# Patient Record
Sex: FEMALE | Race: WHITE | NOT HISPANIC OR LATINO | Employment: FULL TIME | URBAN - METROPOLITAN AREA
[De-identification: names, ages, dates, MRNs, and addresses within clinical notes are randomized per-mention and may not be internally consistent; named-entity substitution may affect disease eponyms.]

---

## 2019-06-06 ENCOUNTER — OFFICE VISIT (OUTPATIENT)
Dept: URGENT CARE | Facility: CLINIC | Age: 54
End: 2019-06-06
Payer: OTHER MISCELLANEOUS

## 2019-06-06 VITALS
OXYGEN SATURATION: 100 % | HEART RATE: 57 BPM | BODY MASS INDEX: 24.63 KG/M2 | HEIGHT: 63 IN | SYSTOLIC BLOOD PRESSURE: 105 MMHG | DIASTOLIC BLOOD PRESSURE: 65 MMHG | RESPIRATION RATE: 16 BRPM | WEIGHT: 139 LBS | TEMPERATURE: 98 F

## 2019-06-06 DIAGNOSIS — S00.03XA CONTUSION OF SCALP, INITIAL ENCOUNTER: ICD-10-CM
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
## 2019-06-06 PROCEDURE — 99203 PR OFFICE/OUTPT VISIT, NEW, LEVL III, 30-44 MIN: ICD-10-PCS | Mod: S$GLB,,, | Performed by: SURGERY

## 2019-06-06 PROCEDURE — 99203 OFFICE O/P NEW LOW 30 MIN: CPT | Mod: S$GLB,,, | Performed by: SURGERY

## 2019-06-06 RX ORDER — ESOMEPRAZOLE MAGNESIUM 40 MG/1
40 CAPSULE, DELAYED RELEASE ORAL DAILY
Refills: 1 | COMMUNITY
Start: 2019-04-22

## 2019-06-06 RX ORDER — IBUPROFEN 200 MG
800 TABLET ORAL
Status: COMPLETED | OUTPATIENT
Start: 2019-06-06 | End: 2019-06-06

## 2019-06-06 RX ORDER — ZOLPIDEM TARTRATE 12.5 MG/1
12.5 TABLET, FILM COATED, EXTENDED RELEASE ORAL NIGHTLY
Refills: 0 | COMMUNITY
Start: 2019-05-23

## 2019-06-06 RX ORDER — DEXTROAMPHETAMINE SACCHARATE, AMPHETAMINE ASPARTATE, DEXTROAMPHETAMINE SULFATE AND AMPHETAMINE SULFATE 5; 5; 5; 5 MG/1; MG/1; MG/1; MG/1
1 TABLET ORAL DAILY
Refills: 0 | COMMUNITY
Start: 2019-05-23

## 2019-06-06 RX ORDER — IBUPROFEN 800 MG/1
TABLET ORAL
Refills: 1 | COMMUNITY
Start: 2019-03-04

## 2019-06-06 RX ADMIN — Medication 800 MG: at 01:06

## 2019-06-06 NOTE — PROGRESS NOTES
"Subjective:       Patient ID: Lilo Ma is a 53 y.o. female.    Vitals:  height is 5' 3" (1.6 m) and weight is 63 kg (139 lb). Her temperature is 97.9 °F (36.6 °C). Her blood pressure is 105/65 and her pulse is 57 (abnormal). Her respiration is 16 and oxygen saturation is 100%.     Chief Complaint: Head Injury    Pt c/o headache and tiredness due to a direct blow to the left side of her head at work today from a metal pole, pt denies any loss of consciousness. She was standing and unbalanced a metal stage pole which fell over and struck her temple. She feels a localized pain on same side and pain in temple when chewing/ moving jaw on same ( left) side. Denies weakness, denies neck pain, blurred vision, no double vision. She was not knocked to the ground.     Head Injury    The incident occurred 3 to 6 hours ago. The injury mechanism was a direct blow. There was no loss of consciousness. There was no blood loss. The quality of the pain is described as throbbing and pulsating. The pain is at a severity of 7/10. The pain is moderate. The pain has been intermittent since the injury. Associated symptoms include headaches. Pertinent negatives include no blurred vision, vomiting or weakness. She has tried NSAIDs for the symptoms. The treatment provided no relief.       Constitution: Positive for fatigue. Negative for chills and fever.   HENT: Negative for congestion and sore throat.    Neck: Negative for painful lymph nodes.   Cardiovascular: Negative for chest pain and leg swelling.   Eyes: Negative for double vision and blurred vision.   Respiratory: Negative for cough and shortness of breath.    Gastrointestinal: Negative for nausea, vomiting and diarrhea.   Genitourinary: Negative for dysuria, frequency, urgency and history of kidney stones.   Musculoskeletal: Negative for joint pain, joint swelling, muscle cramps and muscle ache.   Skin: Negative for color change, pale, rash and bruising.   Allergic/Immunologic: " Negative for seasonal allergies.   Neurological: Positive for headaches. Negative for dizziness, history of vertigo, light-headedness and passing out.   Hematologic/Lymphatic: Negative for swollen lymph nodes.   Psychiatric/Behavioral: Negative for nervous/anxious, sleep disturbance and depression. The patient is not nervous/anxious.        Objective:      Physical Exam   Constitutional: She is oriented to person, place, and time. She appears well-developed and well-nourished. She is cooperative.  Non-toxic appearance. She does not appear ill. No distress.   HENT:   Head: Normocephalic and atraumatic. Head is without raccoon's eyes, without Wakefield's sign, without abrasion, without contusion, without laceration and without right periorbital erythema.       Right Ear: Hearing, tympanic membrane, external ear and ear canal normal. No hemotympanum.   Left Ear: Hearing, tympanic membrane, external ear and ear canal normal. No hemotympanum.   Nose: Nose normal. No mucosal edema, rhinorrhea or nasal deformity. No epistaxis. Right sinus exhibits no maxillary sinus tenderness and no frontal sinus tenderness. Left sinus exhibits no maxillary sinus tenderness and no frontal sinus tenderness.   Mouth/Throat: Uvula is midline, oropharynx is clear and moist and mucous membranes are normal. No trismus in the jaw. Normal dentition. No uvula swelling. No posterior oropharyngeal erythema.   Eyes: Pupils are equal, round, and reactive to light. Conjunctivae, EOM and lids are normal. Right eye exhibits no discharge. Left eye exhibits no discharge. No scleral icterus.   Sclera clear bilat   Neck: Trachea normal, normal range of motion, full passive range of motion without pain and phonation normal. Neck supple. No spinous process tenderness and no muscular tenderness present. No neck rigidity. No tracheal deviation present.   Cardiovascular: Normal rate, regular rhythm, normal heart sounds, intact distal pulses and normal pulses.    Pulmonary/Chest: Effort normal and breath sounds normal. No respiratory distress.   Abdominal: Soft. Normal appearance and bowel sounds are normal. She exhibits no distension, no pulsatile midline mass and no mass. There is no tenderness.   Musculoskeletal: Normal range of motion. She exhibits no edema or deformity.        Cervical back: Normal. She exhibits normal range of motion, no tenderness, no bony tenderness, no pain and no spasm.   Neurological: She is alert and oriented to person, place, and time. She has normal strength. No cranial nerve deficit or sensory deficit. She exhibits normal muscle tone. She displays no seizure activity. Coordination and gait normal. GCS eye subscore is 4. GCS verbal subscore is 5. GCS motor subscore is 6.   CN 2-12 intact.  Neck supple. No pain or tenderness with full ROM   Skin: Skin is warm, dry and intact. Capillary refill takes less than 2 seconds. No abrasion, no bruising, no burn, no ecchymosis and no laceration noted. She is not diaphoretic. No pallor.   Psychiatric: She has a normal mood and affect. Her speech is normal and behavior is normal. Judgment and thought content normal. Cognition and memory are normal.   Nursing note and vitals reviewed.      Assessment:       1. Work related injury    2. Contusion of scalp, initial encounter        Plan:         Work related injury  -     ibuprofen tablet 800 mg    Contusion of scalp, initial encounter      Patient Instructions     Lesión en la lary (Adulto)    Usted tiene wendy lesión en la lary. No parece ser nada elsy por el momento. Cindi los síntomas de un problema más elsy, marco wendy lesión cerebral leve (conmoción), un hematoma o sangrado en el cerebro, pueden aparecer más adelante. Por eso, usted o alguien que esté cuidando de usted deberán vigilar si aparecen los síntomas que se mencionan más abajo. Wendy vez en reis casa, asegúrese de seguir todas las instrucciones que le den.  Cuidados en reis casa  Preste atención a  los siguientes síntomas  Busque atención médica de emergencia si tiene cualquiera de estos síntomas en los próximos días u horas:   · Dolor de lary  · Náuseas o vómito  · Mareo  · Sensibilidad a la pascale o a los ruidos  · Somnolencia o adormecimiento inusuales  · Dificultades para dormirse  · Cambios en la personalidad  · Cambios en la visión  · Pérdida de la memoria  · Confusión  · Dificultades para caminar, o torpeza  · Pérdida del conocimiento (incluso por un breve momento)  · No poder despertarse  · Luis Alfredo rígido  · Debilidad o entumecimiento en cualquier parte del cuerpo  · Convulsiones  Cuidados generales  · Si le recetaron medicamentos para el dolor, tómelos según le hayan indicado. Nota: No tome otros medicamentos calmantes del dolor sin hablar con reis proveedor renu.  · Para ayudar a reducir la inflamación y el dolor, aplique frío sobre la edgar lesionada por hasta 20 minutos cada vez. Hágalo con la frecuencia que le hayan indicado. Use wendy compresa fría o wendy bolsa de hielo envueltas en wendy toalla walter. Nunca aplique frío directamente sobre la piel.  · Si tiene alguna cortada o raspadura a consecuencia de la lesión, cuídelas howie marco le hayan indicado.  · Ben las próximas 24 horas (o más si así le indicaron):  ¨ No tome alcohol, sedantes ni otros medicamentos que pudieran producirle sueño.  ¨ No conduzca un automóvil ni opere maquinaria pesada.  ¨ No nas nada extenuante, marco levantar cosas pesadas ni que requieran mucho esfuerzo.  ¨ Limite las tareas que requieren concentración. Por ejemplo, leer, mirar TV, usar un teléfono inteligente o wendy computadora, y jugar videojuegos.  ¨ No vuelva a hacer deportes o cualquier otra actividad que pudiera causarle otra lesión en la lary.  Visita de control  Nas wendy visita de control con reis proveedor de atención médica o según le hayan indicado. Si le hicieron pruebas de diagnóstico por imágenes, deberá verlas un médico. Le dirán los resultados y los hallazgos  nuevos que pudieran afectar reis tratamiento.  ¿Cuándo debe buscar atención médica?  Llame a reis proveedor de atención médica de inmediato si sucede cualquiera de las siguientes cosas:  · El dolor no se heriberto o empeora  · Aumento o aparición de nueva inflamación o moretones  · Fiebre de 100.4° F (38° C) o más, o según le haya indicado reis proveedor  · Más enrojecimiento, calor, sangrado o supuración en la edgar lesionada  · Supuración de fluido o sangrado de la nariz o los oídos  · Cualquier depresión o anomalía ósea en la edgar lesionada  Date Last Reviewed: 9/26/2015  © 4077-8369 [employer]. [address] [place] [address], [place] [address]. Todos los derechos reservados. Esta información no pretende sustituir la atención médica profesional. Sólo reis médico puede diagnosticar y tratar un problema de maylin.

## 2019-06-06 NOTE — PATIENT INSTRUCTIONS
Lesión en la lary (Adulto)    Usted tiene wendy lesión en la lary. No parece ser nada elsy por el momento. Cindi los síntomas de un problema más elsy, marco wendy lesión cerebral leve (conmoción), un hematoma o sangrado en el cerebro, pueden aparecer más adelante. Por eso, usted o alguien que esté cuidando de usted deberán vigilar si aparecen los síntomas que se mencionan más abajo. Wendy vez en reis casa, asegúrese de seguir todas las instrucciones que le den.  Cuidados en reis casa  Preste atención a los siguientes síntomas  Busque atención médica de emergencia si tiene cualquiera de estos síntomas en los próximos días u horas:   · Dolor de lary  · Náuseas o vómito  · Mareo  · Sensibilidad a la pascale o a los ruidos  · Somnolencia o adormecimiento inusuales  · Dificultades para dormirse  · Cambios en la personalidad  · Cambios en la visión  · Pérdida de la memoria  · Confusión  · Dificultades para caminar, o torpeza  · Pérdida del conocimiento (incluso por un breve momento)  · No poder despertarse  · Luis Alfredo rígido  · Debilidad o entumecimiento en cualquier parte del cuerpo  · Convulsiones  Cuidados generales  · Si le recetaron medicamentos para el dolor, tómelos según le hayan indicado. Nota: No tome otros medicamentos calmantes del dolor sin hablar con reis proveedor renu.  · Para ayudar a reducir la inflamación y el dolor, aplique frío sobre la edgar lesionada por hasta 20 minutos cada vez. Hágalo con la frecuencia que le hayan indicado. Use wendy compresa fría o wendy bolsa de hielo envueltas en wendy toalla walter. Nunca aplique frío directamente sobre la piel.  · Si tiene alguna cortada o raspadura a consecuencia de la lesión, cuídelas howie marco le hayan indicado.  · Ben las próximas 24 horas (o más si así le indicaron):  ¨ No tome alcohol, sedantes ni otros medicamentos que pudieran producirle sueño.  ¨ No conduzca un automóvil ni opere maquinaria pesada.  ¨ No jeanie nada extenuante, marco levantar cosas pesadas ni que  requieran mucho esfuerzo.  ¨ Limite las tareas que requieren concentración. Por ejemplo, leer, mirar TV, usar un teléfono inteligente o wendy computadora, y jugar videojuegos.  ¨ No vuelva a hacer deportes o cualquier otra actividad que pudiera causarle otra lesión en la lary.  Visita de control  Nas wendy visita de control con reis proveedor de atención médica o según le hayan indicado. Si le hicieron pruebas de diagnóstico por imágenes, deberá verlas un médico. Le dirán los resultados y los hallazgos nuevos que pudieran afectar reis tratamiento.  ¿Cuándo debe buscar atención médica?  Llame a reis proveedor de atención médica de inmediato si sucede cualquiera de las siguientes cosas:  · El dolor no se heriberto o empeora  · Aumento o aparición de nueva inflamación o moretones  · Fiebre de 100.4° F (38° C) o más, o según le haya indicado reis proveedor  · Más enrojecimiento, calor, sangrado o supuración en la edgar lesionada  · Supuración de fluido o sangrado de la nariz o los oídos  · Cualquier depresión o anomalía ósea en la edgar lesionada  Date Last Reviewed: 9/26/2015  © 5944-1894 [employer] [address] [place] [address], [place] [address]. Todos los derechos reservados. Esta información no pretende sustituir la atención médica profesional. Sólo reis médico puede diagnosticar y tratar un problema de maylin.

## 2019-06-10 ENCOUNTER — TELEPHONE (OUTPATIENT)
Dept: URGENT CARE | Facility: CLINIC | Age: 54
End: 2019-06-10

## 2019-06-26 ENCOUNTER — OFFICE VISIT (OUTPATIENT)
Dept: URGENT CARE | Facility: CLINIC | Age: 54
End: 2019-06-26
Payer: OTHER MISCELLANEOUS

## 2019-06-26 VITALS
HEART RATE: 73 BPM | DIASTOLIC BLOOD PRESSURE: 84 MMHG | RESPIRATION RATE: 16 BRPM | BODY MASS INDEX: 24.63 KG/M2 | WEIGHT: 139 LBS | OXYGEN SATURATION: 98 % | SYSTOLIC BLOOD PRESSURE: 142 MMHG | TEMPERATURE: 98 F | HEIGHT: 63 IN

## 2019-06-26 DIAGNOSIS — S00.03XA CONTUSION OF PARIETAL REGION OF SCALP, INITIAL ENCOUNTER: Primary | ICD-10-CM

## 2019-06-26 DIAGNOSIS — S06.0X0A CONCUSSION WITHOUT LOSS OF CONSCIOUSNESS, INITIAL ENCOUNTER: ICD-10-CM

## 2019-06-26 DIAGNOSIS — R42 DIZZINESS: ICD-10-CM

## 2019-06-26 PROCEDURE — 99214 PR OFFICE/OUTPT VISIT, EST, LEVL IV, 30-39 MIN: ICD-10-PCS | Mod: S$GLB,,, | Performed by: PREVENTIVE MEDICINE

## 2019-06-26 PROCEDURE — 70260 X-RAY EXAM OF SKULL: CPT | Mod: FY,S$GLB,, | Performed by: RADIOLOGY

## 2019-06-26 PROCEDURE — 70260 XR SKULL COMPLETE MIN 4 VIEWS: ICD-10-PCS | Mod: FY,S$GLB,, | Performed by: RADIOLOGY

## 2019-06-26 PROCEDURE — 99214 OFFICE O/P EST MOD 30 MIN: CPT | Mod: S$GLB,,, | Performed by: PREVENTIVE MEDICINE

## 2019-06-26 RX ORDER — MECLIZINE HYDROCHLORIDE 25 MG/1
25 TABLET ORAL 3 TIMES DAILY PRN
Qty: 20 TABLET | Refills: 0 | Status: SHIPPED | OUTPATIENT
Start: 2019-06-26 | End: 2019-06-26 | Stop reason: SDUPTHER

## 2019-06-26 RX ORDER — MECLIZINE HYDROCHLORIDE 25 MG/1
25 TABLET ORAL 3 TIMES DAILY PRN
Qty: 30 TABLET | Refills: 0 | Status: SHIPPED | OUTPATIENT
Start: 2019-06-26

## 2019-06-26 NOTE — PROGRESS NOTES
Subjective:       Patient ID: Lilo Ma is a 53 y.o. female.    Chief Complaint: Head Injury    New WC Inj of the Head ( DOI 06-06-19 ) while at work picking up tea glasses, she moved a chair thet was supporting a metal pole and she was hit in the left side of her head ( temple) with the pole. One week after that accident, she tripped on a brick walk way of the hotel landing on her right hand and left knee but she didn't report the accident to the Hotel/Employer 06-12-19. Pain score today is 3-4/10 with complaints of headaches, tenderness to the touch of her head at inj site, off balance since last night and nausea. Taking IBP 800mg. SHYAM    Review of Systems   Musculoskeletal: Positive for myalgias.   Gastrointestinal: Positive for nausea.   Neurological: Positive for dizziness, headaches and loss of balance. Negative for numbness.   All other systems reviewed and are negative.      Objective:      Physical Exam   Constitutional: She is oriented to person, place, and time. She appears well-developed and well-nourished.   HENT:   Head: Normocephalic and atraumatic.   Eyes: Pupils are equal, round, and reactive to light. EOM are normal.   Neck: Normal range of motion. Neck supple.   Cardiovascular: Normal rate, regular rhythm and normal heart sounds.   Pulmonary/Chest: Effort normal and breath sounds normal.   Musculoskeletal:        Lumbar back: She exhibits no bony tenderness, no swelling, no edema, no deformity, no laceration, no spasm and normal pulse.   Neurological: She is alert and oriented to person, place, and time. She displays normal reflexes. No cranial nerve deficit or sensory deficit. She exhibits normal muscle tone. Coordination normal.   No focal neurologic deficits   Skin: Skin is warm and dry.   Psychiatric: She has a normal mood and affect. Her behavior is normal. Judgment and thought content normal.   Nursing note and vitals reviewed.    X-ray Skull Complete Min 4 Views    Result Date:  6/26/2019  EXAMINATION: XR SKULL COMPLETE MIN 4 VIEWS CLINICAL HISTORY: Contusion of scalp, initial encounter COMPARISON: None FINDINGS: The cranium is intact.  No fracture or dislocation.  No bone destruction identified.  Mild hyperostosis frontalis interna present     See above Electronically signed by: Wilton Meng MD Date:    06/26/2019 Time:    16:45  Assessment:       1. Contusion of parietal region of scalp, initial encounter    2. Dizziness    3. Concussion without loss of consciousness, initial encounter        Plan:         Medications Ordered This Encounter   Medications    meclizine (ANTIVERT) 25 mg tablet     Sig: Take 1 tablet (25 mg total) by mouth 3 (three) times daily as needed for Dizziness.     Dispense:  30 tablet     Refill:  0     Patient Instructions: (Reassurred regarding results of xray and exam)   Restrictions: Regular Duty(Remain home for 2 days until 6/29/19. Resume regular work 6/29/19)  Follow up in about 5 days (around 7/1/2019).

## 2019-06-26 NOTE — LETTER
[facility] [place]  [facility], [address]  [place] [address]  Phone: [phone]  Fax: [phone]  [facility]: [phone]    Pt Name: Lilo Ma  Injury Date: 06/06/2019   Employee ID: [id number] Date of Treatment: 06/26/2019   Company:  [employer]      Appointment Time:  Arrived: 3:10 PM   Provider: Quinton Limon MD Time Out: 5:20 PM     Office Treatment:   1. Contusion of parietal region of scalp, initial encounter    2. Dizziness    3. Concussion without loss of consciousness, initial encounter      Medications Ordered This Encounter   Medications    meclizine (ANTIVERT) 25 mg tablet      Patient Instructions: (Reassurred regarding results of xray and exam)    Restrictions: Regular Duty(Remain home for 2 days until 6/29/19. Resume regular work 6/29/19)     Return Appointment: 7/1/2019 at 8:30 AM       KD

## 2021-01-05 ENCOUNTER — CLINICAL SUPPORT (OUTPATIENT)
Dept: URGENT CARE | Facility: CLINIC | Age: 56
End: 2021-01-05
Payer: COMMERCIAL

## 2021-01-05 DIAGNOSIS — U07.1 COVID-19 VIRUS DETECTED: ICD-10-CM

## 2021-01-05 DIAGNOSIS — R05.9 COUGH: Primary | ICD-10-CM

## 2021-01-05 LAB
CTP QC/QA: YES
SARS-COV-2 RDRP RESP QL NAA+PROBE: POSITIVE

## 2021-01-05 PROCEDURE — U0002 COVID-19 LAB TEST NON-CDC: HCPCS | Mod: QW,S$GLB,, | Performed by: NURSE PRACTITIONER

## 2021-01-05 PROCEDURE — U0002: ICD-10-PCS | Mod: QW,S$GLB,, | Performed by: NURSE PRACTITIONER

## 2021-01-29 ENCOUNTER — HOSPITAL ENCOUNTER (EMERGENCY)
Facility: HOSPITAL | Age: 56
Discharge: HOME | End: 2021-01-29
Attending: EMERGENCY MEDICINE
Payer: COMMERCIAL

## 2021-01-29 VITALS
RESPIRATION RATE: 18 BRPM | SYSTOLIC BLOOD PRESSURE: 128 MMHG | HEART RATE: 86 BPM | TEMPERATURE: 98 F | BODY MASS INDEX: 26.58 KG/M2 | OXYGEN SATURATION: 100 % | WEIGHT: 150 LBS | DIASTOLIC BLOOD PRESSURE: 80 MMHG | HEIGHT: 63 IN

## 2021-01-29 DIAGNOSIS — F17.200 SMOKING: ICD-10-CM

## 2021-01-29 DIAGNOSIS — R20.2 PARESTHESIA: Primary | ICD-10-CM

## 2021-01-29 DIAGNOSIS — M50.30 DEGENERATIVE DISC DISEASE, CERVICAL: ICD-10-CM

## 2021-01-29 DIAGNOSIS — R20.0 LEFT SIDED NUMBNESS: ICD-10-CM

## 2021-01-29 LAB
ALBUMIN SERPL BCP-MCNC: 4.2 G/DL (ref 3.5–5.2)
ALP SERPL-CCNC: 91 U/L (ref 55–135)
ALT SERPL W/O P-5'-P-CCNC: 21 U/L (ref 10–44)
ANION GAP SERPL CALC-SCNC: 11 MMOL/L (ref 8–16)
AST SERPL-CCNC: 25 U/L (ref 10–40)
BASOPHILS # BLD AUTO: 0.08 K/UL (ref 0–0.2)
BASOPHILS NFR BLD: 0.9 % (ref 0–1.9)
BILIRUB SERPL-MCNC: 0.4 MG/DL (ref 0.1–1)
BUN SERPL-MCNC: 13 MG/DL (ref 6–20)
CALCIUM SERPL-MCNC: 9.5 MG/DL (ref 8.7–10.5)
CHLORIDE SERPL-SCNC: 104 MMOL/L (ref 95–110)
CO2 SERPL-SCNC: 26 MMOL/L (ref 23–29)
CREAT SERPL-MCNC: 0.8 MG/DL (ref 0.5–1.4)
DIFFERENTIAL METHOD: ABNORMAL
EOSINOPHIL # BLD AUTO: 0.2 K/UL (ref 0–0.5)
EOSINOPHIL NFR BLD: 1.9 % (ref 0–8)
ERYTHROCYTE [DISTWIDTH] IN BLOOD BY AUTOMATED COUNT: 13.5 % (ref 11.5–14.5)
EST. GFR  (AFRICAN AMERICAN): >60 ML/MIN/1.73 M^2
EST. GFR  (NON AFRICAN AMERICAN): >60 ML/MIN/1.73 M^2
FOLATE SERPL-MCNC: 15.3 NG/ML (ref 4–24)
GLUCOSE SERPL-MCNC: 92 MG/DL (ref 70–110)
HCT VFR BLD AUTO: 42.3 % (ref 37–48.5)
HCV AB SERPL QL IA: NEGATIVE
HGB BLD-MCNC: 13.6 G/DL (ref 12–16)
HIV 1+2 AB+HIV1 P24 AG SERPL QL IA: NEGATIVE
IMM GRANULOCYTES # BLD AUTO: 0.02 K/UL (ref 0–0.04)
IMM GRANULOCYTES NFR BLD AUTO: 0.2 % (ref 0–0.5)
LYMPHOCYTES # BLD AUTO: 3.2 K/UL (ref 1–4.8)
LYMPHOCYTES NFR BLD: 37 % (ref 18–48)
MAGNESIUM SERPL-MCNC: 2 MG/DL (ref 1.6–2.6)
MCH RBC QN AUTO: 29.4 PG (ref 27–31)
MCHC RBC AUTO-ENTMCNC: 32.2 G/DL (ref 32–36)
MCV RBC AUTO: 92 FL (ref 82–98)
MONOCYTES # BLD AUTO: 1.1 K/UL (ref 0.3–1)
MONOCYTES NFR BLD: 12.5 % (ref 4–15)
NEUTROPHILS # BLD AUTO: 4.1 K/UL (ref 1.8–7.7)
NEUTROPHILS NFR BLD: 47.5 % (ref 38–73)
NRBC BLD-RTO: 0 /100 WBC
PHOSPHATE SERPL-MCNC: 3.9 MG/DL (ref 2.7–4.5)
PLATELET # BLD AUTO: 300 K/UL (ref 150–350)
PMV BLD AUTO: 9.4 FL (ref 9.2–12.9)
POTASSIUM SERPL-SCNC: 3.8 MMOL/L (ref 3.5–5.1)
PROT SERPL-MCNC: 7.8 G/DL (ref 6–8.4)
RBC # BLD AUTO: 4.62 M/UL (ref 4–5.4)
SODIUM SERPL-SCNC: 141 MMOL/L (ref 136–145)
T4 FREE SERPL-MCNC: 1.14 NG/DL (ref 0.71–1.51)
TSH SERPL DL<=0.005 MIU/L-ACNC: 5.96 UIU/ML (ref 0.4–4)
VIT B12 SERPL-MCNC: 496 PG/ML (ref 210–950)
WBC # BLD AUTO: 8.61 K/UL (ref 3.9–12.7)

## 2021-01-29 PROCEDURE — 86703 HIV-1/HIV-2 1 RESULT ANTBDY: CPT

## 2021-01-29 PROCEDURE — 93010 EKG 12-LEAD: ICD-10-PCS | Mod: ,,, | Performed by: INTERNAL MEDICINE

## 2021-01-29 PROCEDURE — 86803 HEPATITIS C AB TEST: CPT

## 2021-01-29 PROCEDURE — 84439 ASSAY OF FREE THYROXINE: CPT

## 2021-01-29 PROCEDURE — 80053 COMPREHEN METABOLIC PANEL: CPT

## 2021-01-29 PROCEDURE — 93005 ELECTROCARDIOGRAM TRACING: CPT

## 2021-01-29 PROCEDURE — 82746 ASSAY OF FOLIC ACID SERUM: CPT

## 2021-01-29 PROCEDURE — 85025 COMPLETE CBC W/AUTO DIFF WBC: CPT

## 2021-01-29 PROCEDURE — 99284 PR EMERGENCY DEPT VISIT,LEVEL IV: ICD-10-PCS | Mod: ,,, | Performed by: EMERGENCY MEDICINE

## 2021-01-29 PROCEDURE — 83735 ASSAY OF MAGNESIUM: CPT

## 2021-01-29 PROCEDURE — 84443 ASSAY THYROID STIM HORMONE: CPT

## 2021-01-29 PROCEDURE — 99285 EMERGENCY DEPT VISIT HI MDM: CPT | Mod: 25

## 2021-01-29 PROCEDURE — 99284 EMERGENCY DEPT VISIT MOD MDM: CPT | Mod: ,,, | Performed by: EMERGENCY MEDICINE

## 2021-01-29 PROCEDURE — 82607 VITAMIN B-12: CPT

## 2021-01-29 PROCEDURE — 93010 ELECTROCARDIOGRAM REPORT: CPT | Mod: ,,, | Performed by: INTERNAL MEDICINE

## 2021-01-29 PROCEDURE — 84100 ASSAY OF PHOSPHORUS: CPT

## 2021-03-28 ENCOUNTER — IMMUNIZATION (OUTPATIENT)
Dept: INTERNAL MEDICINE | Facility: CLINIC | Age: 56
End: 2021-03-28
Payer: COMMERCIAL

## 2021-03-28 DIAGNOSIS — Z23 NEED FOR VACCINATION: Primary | ICD-10-CM

## 2021-03-28 PROCEDURE — 0011A COVID-19, MRNA, LNP-S, PF, 100 MCG/0.5 ML DOSE VACCINE: CPT | Mod: PBBFAC | Performed by: FAMILY MEDICINE

## 2021-04-25 ENCOUNTER — IMMUNIZATION (OUTPATIENT)
Dept: INTERNAL MEDICINE | Facility: CLINIC | Age: 56
End: 2021-04-25
Payer: COMMERCIAL

## 2021-04-25 DIAGNOSIS — Z23 NEED FOR VACCINATION: Primary | ICD-10-CM

## 2021-04-25 PROCEDURE — 0012A COVID-19, MRNA, LNP-S, PF, 100 MCG/0.5 ML DOSE VACCINE: ICD-10-PCS | Mod: CV19,S$GLB,, | Performed by: FAMILY MEDICINE

## 2021-04-25 PROCEDURE — 0012A COVID-19, MRNA, LNP-S, PF, 100 MCG/0.5 ML DOSE VACCINE: CPT | Mod: CV19,S$GLB,, | Performed by: FAMILY MEDICINE

## 2021-04-25 PROCEDURE — 91301 COVID-19, MRNA, LNP-S, PF, 100 MCG/0.5 ML DOSE VACCINE: ICD-10-PCS | Mod: S$GLB,,, | Performed by: FAMILY MEDICINE

## 2021-04-25 PROCEDURE — 91301 COVID-19, MRNA, LNP-S, PF, 100 MCG/0.5 ML DOSE VACCINE: CPT | Mod: S$GLB,,, | Performed by: FAMILY MEDICINE

## 2022-02-22 ENCOUNTER — PATIENT MESSAGE (OUTPATIENT)
Dept: RESEARCH | Facility: HOSPITAL | Age: 57
End: 2022-02-22
Payer: COMMERCIAL

## 2023-10-03 NOTE — LETTER
[facility]  [address] [facility] [address]  Phone: [phone]  Fax: [phone]  [facility]: [phone]    Pt Name: Lilo Ma  Injury Date: 06/06/2019   Employee ID:  Date of First Treatment: 06/06/2019   Company: Networked reference to record [id number]      Appointment Time: 12:10 PM Arrived: 1230 pm   Provider: Esme Mckeon MD Time Out: 0104 pm     Office Treatment:   1. Work related injury    2. Contusion of scalp, initial encounter      Medications Ordered This Encounter   Medications    ibuprofen tablet 800 mg      Patient Instructions: Apply ice 24-48 hours, 20 minutes on then off. Head injury precaution sheet provided. Ibuprofen OTC may be taken as need for headache or tylenol OTC    Restrictions: Regular Duty on 6/7/2019,   .Home today     Return Appointment: Follow up as needed with [facility].        Dr Esme Mckeon MD   Hx of COPD/asthma (at home, typically on 2L O2NC qHS and PRN during daytime). Heavy smoking hx. Denied cough or sputum production.  LE as well, though mild wheezing on exam could be cardiac wheeze  - c/w home budesonide for now  - c/w standing duonebs q6h